# Patient Record
(demographics unavailable — no encounter records)

---

## 2025-06-02 NOTE — ASSESSMENT
[FreeTextEntry1] : 50 year old female with pulsatile tinnitus - audio with normal hearing. discussed CTA given pulsatile nature, not constant, and only on right side. We also discussed discussing with PCP management of HTN as this may be contributing factor.

## 2025-06-02 NOTE — HISTORY OF PRESENT ILLNESS
[de-identified] : 50 year old female presents with pulsatile tinnitus for one month  Hx of HTN and Hypothyroidism.  States she prior to pulsatile tinnitus began she did have a sinus infection that was treated with antibiotics  Also had COVID 04/05/25 Occasionally bilateral ears will pop.  States hearing is normal.  Patient denies otalgia, otorrhea, recent ear infections, bleeding, ear surgeries, dizziness, vertigo, headaches related to hearing.

## 2025-06-02 NOTE — HISTORY OF PRESENT ILLNESS
[de-identified] : 50 year old female presents with pulsatile tinnitus for one month  Hx of HTN and Hypothyroidism.  States she prior to pulsatile tinnitus began she did have a sinus infection that was treated with antibiotics  Also had COVID 04/05/25 Occasionally bilateral ears will pop.  States hearing is normal.  Patient denies otalgia, otorrhea, recent ear infections, bleeding, ear surgeries, dizziness, vertigo, headaches related to hearing.